# Patient Record
Sex: FEMALE | Race: WHITE | NOT HISPANIC OR LATINO | Employment: OTHER | URBAN - METROPOLITAN AREA
[De-identification: names, ages, dates, MRNs, and addresses within clinical notes are randomized per-mention and may not be internally consistent; named-entity substitution may affect disease eponyms.]

---

## 2017-08-08 PROBLEM — M51.369 DDD (DEGENERATIVE DISC DISEASE), LUMBAR: Status: ACTIVE | Noted: 2017-08-08

## 2024-07-29 ENCOUNTER — HOSPITAL ENCOUNTER (OUTPATIENT)
Dept: RADIOLOGY | Facility: HOSPITAL | Age: 79
Discharge: HOME | End: 2024-07-29
Attending: STUDENT IN AN ORGANIZED HEALTH CARE EDUCATION/TRAINING PROGRAM
Payer: MEDICARE

## 2024-07-29 DIAGNOSIS — Z78.0 ENCOUNTER FOR OSTEOPOROSIS SCREENING IN ASYMPTOMATIC POSTMENOPAUSAL PATIENT: ICD-10-CM

## 2024-07-29 DIAGNOSIS — Z13.820 ENCOUNTER FOR OSTEOPOROSIS SCREENING IN ASYMPTOMATIC POSTMENOPAUSAL PATIENT: ICD-10-CM

## 2024-07-29 PROCEDURE — 77080 DXA BONE DENSITY AXIAL: CPT | Mod: 26,ICN,, | Performed by: RADIOLOGY

## 2024-07-29 PROCEDURE — 77080 DXA BONE DENSITY AXIAL: CPT | Mod: TC,PO

## 2024-07-30 ENCOUNTER — TELEPHONE (OUTPATIENT)
Dept: FAMILY MEDICINE | Facility: CLINIC | Age: 79
End: 2024-07-30
Payer: MEDICARE

## 2024-07-30 NOTE — TELEPHONE ENCOUNTER
----- Message from Scott Nava MD sent at 7/30/2024  8:11 AM CDT -----  Lab reviewed: all OK. Please notify pt. Continue pres meds.

## 2024-08-06 ENCOUNTER — PATIENT MESSAGE (OUTPATIENT)
Dept: FAMILY MEDICINE | Facility: CLINIC | Age: 79
End: 2024-08-06
Payer: MEDICARE

## 2024-08-06 DIAGNOSIS — N32.81 OVERACTIVE BLADDER: Primary | ICD-10-CM

## 2024-08-07 RX ORDER — TOLTERODINE 4 MG/1
4 CAPSULE, EXTENDED RELEASE ORAL DAILY
Qty: 90 CAPSULE | Refills: 0 | Status: SHIPPED | OUTPATIENT
Start: 2024-08-07

## 2024-08-07 RX ORDER — TOLTERODINE 4 MG/1
CAPSULE, EXTENDED RELEASE ORAL
OUTPATIENT
Start: 2024-08-07

## 2024-09-03 ENCOUNTER — OFFICE VISIT (OUTPATIENT)
Dept: FAMILY MEDICINE | Facility: CLINIC | Age: 79
End: 2024-09-03
Payer: MEDICARE

## 2024-09-03 VITALS
WEIGHT: 193.81 LBS | BODY MASS INDEX: 31.15 KG/M2 | HEART RATE: 83 BPM | SYSTOLIC BLOOD PRESSURE: 118 MMHG | DIASTOLIC BLOOD PRESSURE: 65 MMHG | HEIGHT: 66 IN

## 2024-09-03 DIAGNOSIS — K21.9 GASTROESOPHAGEAL REFLUX DISEASE WITHOUT ESOPHAGITIS: Primary | ICD-10-CM

## 2024-09-03 DIAGNOSIS — E78.5 HYPERLIPIDEMIA WITH TARGET LOW DENSITY LIPOPROTEIN (LDL) CHOLESTEROL LESS THAN 100 MG/DL: ICD-10-CM

## 2024-09-03 DIAGNOSIS — M85.89 OSTEOPENIA OF MULTIPLE SITES: ICD-10-CM

## 2024-09-03 PROCEDURE — 1126F AMNT PAIN NOTED NONE PRSNT: CPT | Mod: CPTII,S$GLB,, | Performed by: FAMILY MEDICINE

## 2024-09-03 PROCEDURE — 3074F SYST BP LT 130 MM HG: CPT | Mod: CPTII,S$GLB,, | Performed by: FAMILY MEDICINE

## 2024-09-03 PROCEDURE — 99213 OFFICE O/P EST LOW 20 MIN: CPT | Mod: S$GLB,,, | Performed by: FAMILY MEDICINE

## 2024-09-03 PROCEDURE — 3078F DIAST BP <80 MM HG: CPT | Mod: CPTII,S$GLB,, | Performed by: FAMILY MEDICINE

## 2024-09-03 PROCEDURE — 3288F FALL RISK ASSESSMENT DOCD: CPT | Mod: CPTII,S$GLB,, | Performed by: FAMILY MEDICINE

## 2024-09-03 PROCEDURE — 99999 PR PBB SHADOW E&M-EST. PATIENT-LVL III: CPT | Mod: PBBFAC,,, | Performed by: FAMILY MEDICINE

## 2024-09-03 PROCEDURE — 1101F PT FALLS ASSESS-DOCD LE1/YR: CPT | Mod: CPTII,S$GLB,, | Performed by: FAMILY MEDICINE

## 2024-09-03 PROCEDURE — 1159F MED LIST DOCD IN RCRD: CPT | Mod: CPTII,S$GLB,, | Performed by: FAMILY MEDICINE

## 2024-09-03 NOTE — PATIENT INSTRUCTIONS
We understand that controlling diabetes can feel overwhelming at times. We are here to offer the tools and support to help you manage your diabetes and meet your health goals. Please reference the meal planning guide below.     Diabetic Meal Planning    About this topic  Healthy eating is an important part of keeping your diabetes in control. A balanced diet along with your diabetic drugs will help you control your blood sugar. The right portions of healthy foods may help keep your sugar within your goal range. It may also help to lower or maintain your weight, manage cholesterol, the amount of fat in your blood, and blood pressure.      Image(s)    What will the results be?  Healthy eating may help you lower your blood sugar and keep it in a safe range. Keeping your blood sugar in a safe range may lower your chances for long term problems from your diabetes. You may be less likely to have damage to your kidneys, heart, eyes, or nerves.    What changes to diet are needed?  Healthy eating means:  Eating a range of foods from all food groups.  Eating the right size portions. Read the nutrition facts on each food you eat.  Eating meals and snacks at the same time each day. Do not skip a meal or snack. Skipping meals may cause your blood sugar to go too low, especially if you are on drugs for your diabetes. Skipping meals can also cause you to eat too much at the next meal.  Talk to your diabetes educator about making a personal meal plan for you. They can also help you eat the foods you like by modifying them to be healthier.    Who should use this diet?  This diet is helpful to people with high blood sugar or diabetes.    What foods are good to eat?  It is important to make a healthy meal. Eat a variety of different foods in the right portion.  Fill half of your plate with non-starchy vegetables. Non-starchy vegetables include: Broccoli, green beans, carrots, greens (puma, kale, mustard, turnip), onions, tomatoes,  asparagus, beets, cauliflower, celery, and cucumber. Non-starchy vegetables are high in fiber and low in carbohydrates. These will help keep you full for longer without raising your blood sugar.  Fill 1/4 of your plate with carbohydrates. Try to choose whole grain options. Whole-grain products have more fiber which will make you feel full. Carbohydrates include: Bread, rice, pasta, and starchy vegetables. Starchy vegetables include beans, potatoes, acorn squash, butternut squash, corn, and peas.  Fill 1/4 of your plate with protein. Choose low-fat cuts of meat like boneless, skinless chicken breast; pork loin; 90% lean beef; lean and skinless turkey meat; and fresh fish (not fried) such as tuna, salmon, or mackerel.  Add a low-fat or non-fat dairy product like 8 ounces (240 mL) of 1% or skim milk or 6 ounces (180 mL) of low-fat yogurt. Eat the recommended serving. Milk and yogurt have carbohydrates which raise your blood sugar.  Add a small piece of fruit or 1/2 cup (120 grams) of frozen or canned fruit. Choose canned fruits in juice or water. Fruits include apples, bananas, peaches, pears, pineapple, plums, and oranges. Eat the recommended serving. Fruit has carbohydrates which can raise your blood sugar.  Include healthy fats in your meal like olive oil, canola oil, avocado, and nuts.  Other good foods to include in your diet are:  Foods high in fiber. Adding fiber to your meals may help control your blood sugar and cholesterol levels. It may also help with weight loss and prevent belly problems. If you are younger than 50, it is recommended to get 25 to 38 grams per day. If you are older than 50, it is recommended to get 21 to 30 grams per day. Good sources of fiber include:  Nuts and seeds  Beans, peas, and other legumes  Whole-grain products  Fruits  Vegetables  Smart snacks in the right portion. Do not go too long in between meals. Ask your dietitian when you should have a snack in between your meals. Snack on  things like:  Nuts  Vegetables and low-fat dressing  Light popcorn  Low-fat cheese and crackers  1/2 sandwich    What foods should be limited or avoided?  You may need to limit the amount of some foods you eat and how often you eat them. Foods that should be limited include:  High fat or processed foods like:  Meraz  Sausage  Hot dogs  Whole-fat dairy products  Potato chips  Foods high in sodium like:  Canned soups  Soy sauce and some salad dressings  Cured meats  Salted snack foods like pretzels  Olives  Fats and oils like:  Margarine  Salad dressing  Gravy  Lard or shortening  Foods high in sugar like:  Candy  Cookies  Cake  Ice cream  Table sugar  Soda  Juice drinks  Starches that are not whole grain like:  White rice  French fries  White pasta  White bread  Sugary cereals  Baked goods, pastries, croissants  Beer, wine, and mixed drinks (alcohol). Drink alcohol in moderation (1 drink per day or less for adult women and 2 drinks per day or less for adult men). Drinking alcohol can cause fluctuations in your blood sugar and interfere with how your diabetic drugs work. Talk to your doctor about how you can safely include alcohol into your diet.    What can be done to prevent this health problem?  Some people have a higher chance of developing diabetes than others. This is a life-long problem. You can still lead a normal life. Diabetes can be managed through diet, exercise, and drugs. It is important to have support from your family and friends to help you with your goals.    When do I need to call the doctor?  Blood sugar level is above 240 mg/dL for more than a day  Blood sugar level drops to less than 40 and does not respond to 15 grams of simple carbohydrate, like 4 glucose tablets or 1/2 cup (120 mL) of fruit juice  Trouble breathing  Very sleepy and trouble concentrating  Feeling very thirsty  Needing to urinate (pee) more than normal  Throw up more than once or have many loose stools  You are so sick you  cannot eat or drink  Fever over 101°F (38°C)  Questions about your diet plan  You are not feeling better in 2 to 3 days or you are feeling worse    Helpful tips  Plan ahead. Plan your meals and grocery list before going to the store. This will help you to choose foods that are good for you.  Pack a lunch. Take healthy meals and snacks with you to work.  Keep a food journal to help keep you on track. Take note of foods that keep your blood sugar in goal range. Also note foods and meals that raise or lower your blood sugar. There are apps for your phone that can help with this.  Visit a restaurant's website before eating out. You can see the menu options and nutritional facts. This way, you can see which items best fit into your diet plan. Call ahead if you have questions.    Disclaimer.This generalized information is a limited summary of diagnosis, treatment, and/or medication information. It is not meant to be comprehensive and should be used as a tool to help the user understand and/or assess potential diagnostic and treatment options. It does NOT include all information about conditions, treatments, medications, side effects, or risks that may apply to a specific patient. It is not intended to be medical advice or a substitute for the medical advice, diagnosis, or treatment of a health care provider based on the health care provider's examination and assessment of a patient's specific and unique circumstances. Patients must speak with a health care provider for complete information about their health, medical questions, and treatment options, including any risks or benefits regarding use of medications. This information does not endorse any treatments or medications as safe, effective, or approved for treating a specific patient. [employer] and its affiliates disclaim any warranty or liability relating to this information or the use thereof. The use of this information is governed by the Terms of Use,  available at Terms of Use. ©2022 [employer], Inc. and its affiliates and/or licensors. All rights reserved. Avoid anything with sugar in the 1st 3 ingredients including honey and syrup.  Avoid dried fruits like raise nodes and apricots.  Other fruits and vegetables are okay but the sugar content is higher in bananas and grapes.  Avoid large portions on your starchy foods:  Bread rice potatoes and pasta.

## 2024-09-03 NOTE — PROGRESS NOTES
Patient is here for follow-up on cholesterol.  Patient has a history of hyperlipidemia no hypertension but her blood pressure at home has been running in the 130s.  Patient Active Problem List   Diagnosis    Asthma in remission    Insomnia    Hyperlipidemia with target low density lipoprotein (LDL) cholesterol less than 100 mg/dL    DDD (degenerative disc disease), lumbar    Lumbar herniated disc    Lumbar radiculopathy    Lumbar spondylosis    Lumbar spinal stenosis    Cervical radiculopathy    Cervical stenosis of spinal canal    Herniated cervical disc    DDD (degenerative disc disease), cervical    Osteoarthritis of spine with radiculopathy, cervical region    Gastroesophageal reflux disease    Steatosis of liver    Irritable bowel syndrome with constipation    Overactive bladder    Anxiety and depression    Chronic left shoulder pain    Palpitations    Abnormal CT scan of lung- RLL nodule - 2mm, ex smoker    Liver cyst    Arthropathy of knee    Costochondritis    Multiple joint pain    Intermittent right upper quadrant abdominal pain     Lab Results   Component Value Date    WBC 6.86 02/04/2022    HGB 14.1 02/04/2022    HCT 46.3 02/04/2022     02/04/2022    CHOL 172 02/26/2024    TRIG 227 (H) 02/26/2024    HDL 53 02/26/2024    ALT 25 02/26/2024    AST 20 02/26/2024     02/26/2024    K 3.9 02/26/2024     02/26/2024    CREATININE 0.9 02/26/2024    BUN 12 02/26/2024    CO2 30 (H) 02/26/2024    TSH 1.16 01/15/2019    INR 1.0 04/04/2017    HGBA1C 6.1 07/29/2024     Lab Results   Component Value Date    CHOL 172 02/26/2024    CHOL 186 04/24/2023    CHOL 163 02/04/2022     Lab Results   Component Value Date    HDL 53 02/26/2024    HDL 64 04/24/2023    HDL 54 02/04/2022     Lab Results   Component Value Date    LDLCALC 73.6 02/26/2024    LDLCALC 87.2 04/24/2023    LDLCALC 72.6 02/04/2022     Lab Results   Component Value Date    TRIG 227 (H) 02/26/2024    TRIG 174 (H) 04/24/2023    TRIG 182 (H)  2022       Lab Results   Component Value Date    CHOLHDL 30.8 2024    CHOLHDL 34.4 2023    CHOLHDL 33.1 2022     Subjective:       Patient ID: Kamila Lynn is a 79 y.o. female.    Chief Complaint: Follow-up (6 month. Pt states she is having issues falling asleep and feels she may have fluid build up in ankles and arms.)      Follow-up        Allergies and Medications:   Review of patient's allergies indicates:   Allergen Reactions    Inhalants Shortness Of Breath     Current Outpatient Medications   Medication Sig Dispense Refill    albuterol (PROAIR HFA) 90 mcg/actuation inhaler Inhale 2 puffs into the lungs every 4 (four) hours as needed for Wheezing. 18 g 3    atorvastatin (LIPITOR) 20 MG tablet Take 1 tablet (20 mg total) by mouth once daily. 90 tablet 3    BREO ELLIPTA 200-25 mcg/dose DsDv diskus inhaler Inhale 1 puff by mouth once daily 180 each 1    DULoxetine (CYMBALTA) 60 MG capsule Take 1 capsule by mouth once daily 90 capsule 0    fexofenadine (ALLEGRA) 60 MG tablet Take 60 mg by mouth once daily.      tolterodine (DETROL LA) 4 MG 24 hr capsule Take 1 capsule (4 mg total) by mouth once daily. 90 capsule 0    vit A/vit C/vit E/zinc/copper (ICAPS AREDS ORAL) Take by mouth.      vitamin D (VITAMIN D3) 1000 units Tab Take 1 tablet (1,000 Units total) by mouth once daily. 30 tablet 11    calcium carbonate (OS-SHANNAN) 600 mg calcium (1,500 mg) Tab Take 600 mg by mouth once. (Patient not taking: Reported on 9/3/2024)      cetirizine (ZYRTEC) 10 MG tablet Take 1 tablet (10 mg total) by mouth daily as needed for Allergies. 7 tablet 0    fluocinolone acetonide oiL 0.01 % Drop SMARTSI Drop(s) In Ear(s) Twice Daily PRN (Patient not taking: Reported on 9/3/2024)      fluticasone (FLONASE) 50 mcg/actuation nasal spray 1 spray (50 mcg total) by Each Nare route once daily. (Patient not taking: Reported on 2023) 3 Bottle 3     No current facility-administered medications for this visit.        Family History:   Family History   Problem Relation Name Age of Onset    Alzheimer's disease Mother      Pulmonary embolism Father      Cancer Paternal Grandfather          colon       Social History:   Social History     Socioeconomic History    Marital status:    Occupational History    Occupation: retired     Employer: [employer]   Tobacco Use    Smoking status: Former     Current packs/day: 0.00     Average packs/day: 0.5 packs/day for 10.0 years (5.0 ttl pk-yrs)     Types: Cigarettes     Start date: 2003     Quit date: 2013     Years since quittin.3     Passive exposure: Never    Smokeless tobacco: Never    Tobacco comments:        Substance and Sexual Activity    Alcohol use: No    Drug use: No    Sexual activity: Yes     Partners: Male   Social History Narrative    Live with      Social Determinants of Health     Physical Activity: Unknown (2/3/2022)    Exercise Vital Sign     Days of Exercise per Week: 0 days   Stress: No Stress Concern Present (2/3/2022)    [facility] - Occupational Stress Questionnaire     Feeling of Stress : Not at all       Review of Systems    Objective:     Vitals:    24 1009   BP: 118/65   Pulse: 83        Physical Exam  Vitals and nursing note reviewed.   Constitutional:       General: She is not in acute distress.     Appearance: Normal appearance. She is well-developed and normal weight. She is not ill-appearing, toxic-appearing or diaphoretic.   HENT:      Head: Normocephalic and atraumatic.   Eyes:      Pupils: Pupils are equal, round, and reactive to light.   Cardiovascular:      Rate and Rhythm: Normal rate and regular rhythm.      Heart sounds: Normal heart sounds. No murmur heard.     No friction rub. No gallop.   Pulmonary:      Effort: Pulmonary effort is normal. No respiratory distress.      Breath sounds: Normal breath sounds. No stridor. No wheezing, rhonchi or rales.   Chest:      Chest  wall: No tenderness.   Musculoskeletal:      Right lower leg: No edema.      Left lower leg: No edema.   Neurological:      Mental Status: She is alert.   Psychiatric:         Behavior: Behavior normal.         Thought Content: Thought content normal.         Judgment: Judgment normal.         Assessment:       1. Gastroesophageal reflux disease without esophagitis    2. Hyperlipidemia with target low density lipoprotein (LDL) cholesterol less than 100 mg/dL    3. Osteopenia of multiple sites        Plan:     1. Gastroesophageal reflux disease without esophagitis  Overview:  3/2016:  abd pain after using NSAIDS,  PPI  first, consider EGD if not improving.7/2016: not on ppi . abd pain improved.      2. Hyperlipidemia with target low density lipoprotein (LDL) cholesterol less than 100 mg/dL  -     Lipid Panel; Future; Expected date: 09/03/2024  -     Comprehensive Metabolic Panel; Future; Expected date: 09/03/2024    3. Osteopenia of multiple sites        Kamila was seen today for follow-up.    Diagnoses and all orders for this visit:    Gastroesophageal reflux disease without esophagitis    Hyperlipidemia with target low density lipoprotein (LDL) cholesterol less than 100 mg/dL  -     Lipid Panel; Future  -     Comprehensive Metabolic Panel; Future    Osteopenia of multiple sites         Follow up in about 6 months (around 3/3/2025).

## 2024-09-18 DIAGNOSIS — E78.5 HYPERLIPIDEMIA WITH TARGET LOW DENSITY LIPOPROTEIN (LDL) CHOLESTEROL LESS THAN 100 MG/DL: ICD-10-CM

## 2024-09-18 RX ORDER — ATORVASTATIN CALCIUM 20 MG/1
20 TABLET, FILM COATED ORAL
Qty: 90 TABLET | Refills: 1 | Status: SHIPPED | OUTPATIENT
Start: 2024-09-18

## 2024-10-31 DIAGNOSIS — F41.1 GAD (GENERALIZED ANXIETY DISORDER): ICD-10-CM

## 2024-11-01 RX ORDER — DULOXETIN HYDROCHLORIDE 60 MG/1
60 CAPSULE, DELAYED RELEASE ORAL
Qty: 90 CAPSULE | Refills: 1 | Status: SHIPPED | OUTPATIENT
Start: 2024-11-01

## 2024-12-13 DIAGNOSIS — N32.81 OVERACTIVE BLADDER: ICD-10-CM

## 2024-12-16 RX ORDER — TOLTERODINE 4 MG/1
4 CAPSULE, EXTENDED RELEASE ORAL
Qty: 90 CAPSULE | Refills: 0 | Status: SHIPPED | OUTPATIENT
Start: 2024-12-16

## 2025-01-02 DIAGNOSIS — F41.1 GAD (GENERALIZED ANXIETY DISORDER): ICD-10-CM

## 2025-01-03 RX ORDER — DULOXETIN HYDROCHLORIDE 60 MG/1
60 CAPSULE, DELAYED RELEASE ORAL
Qty: 90 CAPSULE | Refills: 1 | OUTPATIENT
Start: 2025-01-03

## 2025-03-06 ENCOUNTER — TELEPHONE (OUTPATIENT)
Dept: FAMILY MEDICINE | Facility: CLINIC | Age: 80
End: 2025-03-06
Payer: MEDICARE

## 2025-03-06 DIAGNOSIS — M85.9 DISORDER OF BONE DENSITY AND STRUCTURE, UNSPECIFIED: ICD-10-CM

## 2025-03-06 DIAGNOSIS — N32.81 OVERACTIVE BLADDER: ICD-10-CM

## 2025-03-06 DIAGNOSIS — M85.89 OSTEOPENIA OF MULTIPLE SITES: Primary | ICD-10-CM

## 2025-03-17 DIAGNOSIS — N32.81 OVERACTIVE BLADDER: ICD-10-CM

## 2025-03-17 DIAGNOSIS — E78.5 HYPERLIPIDEMIA WITH TARGET LOW DENSITY LIPOPROTEIN (LDL) CHOLESTEROL LESS THAN 100 MG/DL: ICD-10-CM

## 2025-03-17 RX ORDER — TOLTERODINE 4 MG/1
4 CAPSULE, EXTENDED RELEASE ORAL
Qty: 90 CAPSULE | Refills: 1 | Status: SHIPPED | OUTPATIENT
Start: 2025-03-17

## 2025-03-18 ENCOUNTER — LAB VISIT (OUTPATIENT)
Dept: LAB | Facility: HOSPITAL | Age: 80
End: 2025-03-18
Attending: FAMILY MEDICINE
Payer: MEDICARE

## 2025-03-18 DIAGNOSIS — N32.81 OVERACTIVE BLADDER: ICD-10-CM

## 2025-03-18 DIAGNOSIS — E78.5 HYPERLIPIDEMIA WITH TARGET LOW DENSITY LIPOPROTEIN (LDL) CHOLESTEROL LESS THAN 100 MG/DL: ICD-10-CM

## 2025-03-18 LAB
ALBUMIN SERPL BCP-MCNC: 4 G/DL (ref 3.5–5.2)
ALBUMIN/CREAT UR: 12.1 UG/MG (ref 0–30)
ALP SERPL-CCNC: 115 U/L (ref 55–135)
ALT SERPL W/O P-5'-P-CCNC: 32 U/L (ref 10–44)
ANION GAP SERPL CALC-SCNC: 8 MMOL/L (ref 8–16)
AST SERPL-CCNC: 27 U/L (ref 10–40)
BILIRUB SERPL-MCNC: 0.6 MG/DL (ref 0.1–1)
BUN SERPL-MCNC: 15 MG/DL (ref 8–23)
CALCIUM SERPL-MCNC: 9.5 MG/DL (ref 8.7–10.5)
CHLORIDE SERPL-SCNC: 105 MMOL/L (ref 95–110)
CHOLEST SERPL-MCNC: 164 MG/DL (ref 120–199)
CHOLEST/HDLC SERPL: 2.8 {RATIO} (ref 2–5)
CO2 SERPL-SCNC: 26 MMOL/L (ref 23–29)
CREAT SERPL-MCNC: 0.7 MG/DL (ref 0.5–1.4)
CREAT UR-MCNC: 80.2 MG/DL (ref 15–325)
EST. GFR  (NO RACE VARIABLE): >60 ML/MIN/1.73 M^2
GLUCOSE SERPL-MCNC: 87 MG/DL (ref 70–110)
HDLC SERPL-MCNC: 59 MG/DL (ref 40–75)
HDLC SERPL: 36 % (ref 20–50)
LDLC SERPL CALC-MCNC: 72.2 MG/DL (ref 63–159)
MICROALBUMIN UR DL<=1MG/L-MCNC: 9.7 UG/ML
NONHDLC SERPL-MCNC: 105 MG/DL
POTASSIUM SERPL-SCNC: 4.3 MMOL/L (ref 3.5–5.1)
PROT SERPL-MCNC: 6.9 G/DL (ref 6–8.4)
SODIUM SERPL-SCNC: 139 MMOL/L (ref 136–145)
TRIGL SERPL-MCNC: 164 MG/DL (ref 30–150)

## 2025-03-18 PROCEDURE — 80061 LIPID PANEL: CPT | Performed by: FAMILY MEDICINE

## 2025-03-18 PROCEDURE — 36415 COLL VENOUS BLD VENIPUNCTURE: CPT | Performed by: FAMILY MEDICINE

## 2025-03-18 PROCEDURE — 80053 COMPREHEN METABOLIC PANEL: CPT | Performed by: FAMILY MEDICINE

## 2025-03-18 PROCEDURE — 82043 UR ALBUMIN QUANTITATIVE: CPT | Performed by: FAMILY MEDICINE

## 2025-03-18 RX ORDER — ATORVASTATIN CALCIUM 20 MG/1
20 TABLET, FILM COATED ORAL
Qty: 90 TABLET | Refills: 0 | Status: SHIPPED | OUTPATIENT
Start: 2025-03-18

## 2025-03-18 NOTE — TELEPHONE ENCOUNTER
Care Due:                  Date            Visit Type   Department     Provider  --------------------------------------------------------------------------------                                             [facility]                              [facility]   [address]  Last Visit: 09-      PATIENT      FAMILY Jason Nava                               -         [facility] [address]  Next Visit: 04-      CARE ([facility])   [facility] Jason Nava                                                            Last  Test          Frequency    Reason                     Performed    Due Date  --------------------------------------------------------------------------------    CMP.........  12 months..  DULoxetine, atorvastatin.  02- 02-    Lipid Panel.  12 months..  atorvastatin.............  02- 02-    [facility] Embedded Care Due Messages. Reference number: [id number].   3/18/2025 6:02:55 AM CDT

## 2025-03-18 NOTE — TELEPHONE ENCOUNTER
Refill Routing Note   Medication(s) are not appropriate for processing by [facility] for the following reason(s):        Required labs outdated    [facility] action(s):  Defer     Requires labs : Yes             Appointments  past 12m or future 3m with PCP    Date Provider   Last Visit   9/3/2024 Scott Nava MD   Next Visit   4/25/2025 Scott Nava MD   ED visits in past 90 days: 0        Note composed:6:11 AM 03/18/2025

## 2025-03-19 ENCOUNTER — TELEPHONE (OUTPATIENT)
Dept: FAMILY MEDICINE | Facility: CLINIC | Age: 80
End: 2025-03-19
Payer: MEDICARE

## 2025-03-19 ENCOUNTER — RESULTS FOLLOW-UP (OUTPATIENT)
Dept: FAMILY MEDICINE | Facility: CLINIC | Age: 80
End: 2025-03-19

## 2025-03-19 NOTE — TELEPHONE ENCOUNTER
----- Message from Scott Nava MD sent at 3/19/2025  8:07 AM CDT -----  Lab reviewed: all OK. Please notify pt. Continue pres meds.  ----- Message -----  From: [facility] S[facility] Lab Interface  Sent: 3/18/2025   1:55 PM CDT  To: Scott Nava MD

## 2025-04-10 DIAGNOSIS — F41.1 GAD (GENERALIZED ANXIETY DISORDER): ICD-10-CM

## 2025-04-11 RX ORDER — DULOXETIN HYDROCHLORIDE 60 MG/1
60 CAPSULE, DELAYED RELEASE ORAL
Qty: 90 CAPSULE | Refills: 1 | Status: SHIPPED | OUTPATIENT
Start: 2025-04-11

## 2025-04-11 NOTE — TELEPHONE ENCOUNTER
Refill Routing Note   Medication(s) are not appropriate for processing by [facility] for the following reason(s):     [facility] not previously overridden by current provider--after initial override, the [facility] will be able to continue overrides      Drug-disease interaction: DULoxetine and Steatosis of liver; Liver cyst     [facility] action(s):  Defer           Pharmacist review requested: Yes     Appointments  past 12m or future 3m with PCP    Date Provider   Last Visit   9/3/2024 Scott Nava MD   Next Visit   4/25/2025 Scott Nava MD   ED visits in past 90 days: 0        Note composed:11:51 PM 04/10/2025

## 2025-04-11 NOTE — TELEPHONE ENCOUNTER
Refill Decision Note   Kamila Lynn  is requesting a refill authorization.  Brief Assessment and Rationale for Refill:  Approve     Medication Therapy Plan:         Pharmacist review requested: Yes   Comments:     Note composed:6:26 AM 04/11/2025

## 2025-04-11 NOTE — TELEPHONE ENCOUNTER
No care due was identified.  [facility] Embedded Care Due Messages. Reference number: [id number].   4/10/2025 11:49:33 PM CDT

## 2025-04-25 ENCOUNTER — OFFICE VISIT (OUTPATIENT)
Dept: FAMILY MEDICINE | Facility: CLINIC | Age: 80
End: 2025-04-25
Payer: MEDICARE

## 2025-04-25 VITALS
TEMPERATURE: 98 F | WEIGHT: 198.44 LBS | OXYGEN SATURATION: 97 % | HEART RATE: 74 BPM | RESPIRATION RATE: 18 BRPM | HEIGHT: 66 IN | DIASTOLIC BLOOD PRESSURE: 82 MMHG | BODY MASS INDEX: 31.89 KG/M2 | SYSTOLIC BLOOD PRESSURE: 114 MMHG

## 2025-04-25 DIAGNOSIS — K76.0 STEATOSIS OF LIVER: ICD-10-CM

## 2025-04-25 DIAGNOSIS — R73.01 IMPAIRED FASTING BLOOD SUGAR: ICD-10-CM

## 2025-04-25 DIAGNOSIS — J44.9 CHRONIC OBSTRUCTIVE PULMONARY DISEASE, UNSPECIFIED COPD TYPE: ICD-10-CM

## 2025-04-25 DIAGNOSIS — J45.998 ASTHMA IN REMISSION: ICD-10-CM

## 2025-04-25 DIAGNOSIS — M47.22 OSTEOARTHRITIS OF SPINE WITH RADICULOPATHY, CERVICAL REGION: ICD-10-CM

## 2025-04-25 DIAGNOSIS — E78.5 HYPERLIPIDEMIA WITH TARGET LOW DENSITY LIPOPROTEIN (LDL) CHOLESTEROL LESS THAN 100 MG/DL: Primary | ICD-10-CM

## 2025-04-25 PROCEDURE — 99999 PR PBB SHADOW E&M-EST. PATIENT-LVL IV: CPT | Mod: PBBFAC,,, | Performed by: FAMILY MEDICINE

## 2025-04-25 NOTE — PROGRESS NOTES
Subjective:       Patient ID: Kamila Lynn is a 79 y.o. female.    Chief Complaint: Follow-up (6 month f/u)      History of Present Illness    CHIEF COMPLAINT:  Ms. Lynn presents for a six-month follow-up on chronic conditions, including cholesterol management, back and neck pain, and pre-diabetes.    HPI:  Ms. Lynn reports ongoing back and neck pain. She underwent an epidural injection and ablation procedure with pain management, which provided some relief but did not fully resolve the pain. She has significant pain when walking.    Her glucose levels have been monitored recently. A post-meal glucose reading was 153 mg/dL, which was slightly elevated. Her most recent A1C was 6.1%, indicating pre-diabetes.    Ms. Lynn has a history of high focal asthma, diagnosed in her late 30s. She continues to use daily maintenance inhalers for breathing management, which appears to be stable.    Regarding weight management, patient reports difficulty losing weight. She had significant weight loss last summer, going down to 193 lbs in September, but has since regained most of it, currently weighing 198 lbs. She is frustrated with her inability to maintain weight loss and identifies this as her main concern.    Ms. Lynn also has a history of fatty liver disease and arthritis, which affects her mobility.    Ms. Lynn denies having diabetes.    MEDICATIONS:  Ms. Lynn is on Metformin and Rebelsus for diabetes management. She uses Flonase for nasal symptoms and takes a daily multivitamin. For allergies, she is on Allegra, having discontinued Zyrtec. She uses maintenance inhalers daily for breathing and asthma control. Ms. Lynn restarted Rebelsus on March 28th after stopping it for a while.    MEDICAL HISTORY:  Ms. Lynn has a history of pre-diabetes with an A1C of 6.1. She was diagnosed with high focal asthma in her late 30s. She also has fatty liver, impaired fasting glucose,  "arthritis, hyperlipidemia, and spinal stenosis.    SURGICAL HISTORY:  Ms. Lynn has received epidural injections and undergone ablation procedures for back and neck pain management.    TEST RESULTS:  Ms. Lynn's A1C was 6.1 about a month ago. Her glucose level was 153 after supper a few days ago. Her cholesterol levels, checked about a month ago, were described as "good".      ROS:  Musculoskeletal: +joint pain, +back pain, +pain with movement, +limited movement  Allergic: +allergic reactions, +seasonal allergies          Allergies and Medications:   Review of patient's allergies indicates:   Allergen Reactions    Inhalants Shortness Of Breath     Current Medications[1]    Family History:   Family History   Problem Relation Name Age of Onset    Alzheimer's disease Mother      Pulmonary embolism Father      Cancer Paternal Grandfather          colon       Social History:   Social History[2]        Objective:     Vitals:    04/25/25 1356   BP: 114/82   Pulse: 74   Resp: 18   Temp: 98.3 °F (36.8 °C)        Physical Exam    General: No acute distress. Well-developed. Well-nourished.  Eyes: EOMI. Sclerae anicteric.  HENT: Normocephalic. Atraumatic. Nares patent. Moist oral mucosa.  Cardiovascular: Regular rate. Regular rhythm. No murmurs. No rubs. No gallops. Normal S1, S2. PMI at left mid-clavicular line.  Respiratory: Normal respiratory effort. Clear to auscultation bilaterally. No rales. No rhonchi. No wheezing.  Musculoskeletal: No  obvious deformity.  Extremities: No lower extremity edema. Extremities symmetrical and developed with some arthritis. Trace peripheral edema bilaterally.  Neurological: Alert & oriented x3. No slurred speech. Normal gait.  Psychiatric: Normal mood. Normal affect. Good insight. Good judgment.  Skin: Warm. Acrocyanosis with good capillary refill. No rash.            Assessment:       1. Hyperlipidemia with target low density lipoprotein (LDL) cholesterol less than 100 mg/dL    2. " Osteoarthritis of spine with radiculopathy, cervical region    3. Steatosis of liver    4. Asthma in remission    5. Chronic obstructive pulmonary disease, unspecified COPD type    6. Impaired fasting blood sugar        Plan:     1. Hyperlipidemia with target low density lipoprotein (LDL) cholesterol less than 100 mg/dL    2. Osteoarthritis of spine with radiculopathy, cervical region    3. Steatosis of liver    4. Asthma in remission  Overview:  3/2016: asthma vs COPD ? on advair since 2013. stable.      5. Chronic obstructive pulmonary disease, unspecified COPD type  Assessment & Plan:  Daily maintenance inhaler and stable      6. Impaired fasting blood sugar        Assessment & Plan    J44.9 Chronic obstructive pulmonary disease, unspecified  R73.03 Prediabetes  R73.01 Impaired fasting glucose  E78.5 Hyperlipidemia, unspecified  K76.0 Fatty (change of) liver, not elsewhere classified  J45.30 Mild persistent asthma, uncomplicated  M19.91 Primary osteoarthritis, unspecified site  M54.50 Low back pain, unspecified  M54.2 Cervicalgia  R23.0 Cyanosis  R60.0 Localized edema    IMPRESSION:  - Pre-diabetes with A1C of 6.1, not meeting criteria for diabetes diagnosis.  - Considered weight loss strategies, including diabetic diet plan to help with portion control and reduce sugar/carbohydrate intake.  - Monitored cholesterol levels, which were reported as good in recent labs.  - Assessed effectiveness of current asthma management with daily maintenance inhalers.  - Evaluated back pain management, noting recent epidural and ablation procedures by pain management specialist.  - Considered thyroid function reassessment due to past history of hypothyroidism.    CHRONIC OBSTRUCTIVE PULMONARY DISEASE, UNSPECIFIED:  - Continued patient's daily maintenance inhalers for breathing management.  - Lung auscultation revealed clear bilateral breath sounds.  - Noted patient's history of high focal asthma diagnosed in late  30s.    PREDIABETES/IMPAIRED FASTING GLUCOSE:  - Evaluated A1C level of 6.1, indicating prediabetes.  - Explained that A1C level above 6.5 are considered diabetic, with the goal to maintain below 6.5.  - Ordered A1C test in 1 month to assess Rebelsus efficacy.  - Noted glucose level of 153 after supper, which is slightly elevated.  - Ms. Lynn has not been checking fasting glucose levels at home.  - Recommend and provided instructions for a diabetic diet to aid in weight loss and blood sugar control, emphasizing benefits even for non-diabetics by reducing sugars and complex carbohydrates.  - Discussed mechanism of action for weight loss medications like Ozempic, which reduce appetite and promote early satiety.    HYPERLIPIDEMIA:  - Reviewed cholesterol levels from tests conducted approximately 1 month ago, which were within acceptable range.    FATTY LIVER DISEASE:  - Noted the patient's diagnosis of fatty liver.    ASTHMA:  - No changes to daily maintenance inhalers.    OSTEOARTHRITIS:  - Noted presence of arthritis in extremities, acknowledging it as a contributing factor to the patient's reduced mobility.    LOW BACK PAIN:  - Ms. Lynn reported ongoing back pain, especially when walking.  - Reviewed recent epidural and ablation treatment received from a pain management specialist.    CERVICALGIA:  - Noted patient's report of ongoing neck pain.    CYANOSIS:  - Observed acrocyanosis in extremities during physical exam.    LOCALIZED EDEMA:  - Observed trace peripheral edema bilaterally in extremities during physical exam.    OTHER:  - Started Vitamin D supplement, once weekly dosing.    FOLLOW-UP:  - Scheduled follow-up visit in 4-6 months or upon return from travel.        Kamila was seen today for follow-up.    Diagnoses and all orders for this visit:    Hyperlipidemia with target low density lipoprotein (LDL) cholesterol less than 100 mg/dL    Osteoarthritis of spine with radiculopathy, cervical  region    Steatosis of liver    Asthma in remission    Chronic obstructive pulmonary disease, unspecified COPD type    Impaired fasting blood sugar         Follow up in about 6 months (around 10/25/2025).  This note was generated with the assistance of ambient listening technology. Verbal consent was obtained by the patient and accompanying visitor(s) for the recording of patient appointment to facilitate this note. I attest to having reviewed and edited the generated note for accuracy, though some syntax or spelling errors may persist. Please contact the author of this note for any clarification.     Subjective:       Patient ID: Kamila Lynn is a 79 y.o. female.    Chief Complaint: Follow-up (6 month f/u)      History of Present Illness    CHIEF COMPLAINT:  Ms. Lynn presents for a six-month follow-up on chronic conditions, including cholesterol management, back and neck pain, and pre-diabetes.    HPI:  Ms. Lynn reports ongoing back and neck pain. She underwent an epidural injection and ablation procedure with pain management, which provided some relief but did not fully resolve the pain. She has significant pain when walking.    Her glucose levels have been monitored recently. A post-meal glucose reading was 153 mg/dL, which was slightly elevated. Her most recent A1C was 6.1%, indicating pre-diabetes.    Ms. Lynn has a history of high focal asthma, diagnosed in her late 30s. She continues to use daily maintenance inhalers for breathing management, which appears to be stable.    Regarding weight management, patient reports difficulty losing weight. She had significant weight loss last summer, going down to 193 lbs in September, but has since regained most of it, currently weighing 198 lbs. She is frustrated with her inability to maintain weight loss and identifies this as her main concern.    Ms. Lynn also has a history of fatty liver disease and arthritis, which affects her  "mobility.    Ms. Lynn denies having diabetes.    MEDICATIONS:  Ms. Lynn is on Metformin and Rebelsus for diabetes management. She uses Flonase for nasal symptoms and takes a daily multivitamin. For allergies, she is on Allegra, having discontinued Zyrtec. She uses maintenance inhalers daily for breathing and asthma control. Ms. Lynn restarted Rebelsus on March 28th after stopping it for a while.    MEDICAL HISTORY:  Ms. Lynn has a history of pre-diabetes with an A1C of 6.1. She was diagnosed with high focal asthma in her late 30s. She also has fatty liver, impaired fasting glucose, arthritis, hyperlipidemia, and spinal stenosis.    SURGICAL HISTORY:  Ms. Lynn has received epidural injections and undergone ablation procedures for back and neck pain management.    TEST RESULTS:  Ms. Lynn's A1C was 6.1 about a month ago. Her glucose level was 153 after supper a few days ago. Her cholesterol levels, checked about a month ago, were described as "good".      ROS:  Musculoskeletal: +joint pain, +back pain, +pain with movement, +limited movement  Allergic: +allergic reactions, +seasonal allergies          Allergies and Medications:   Review of patient's allergies indicates:   Allergen Reactions    Inhalants Shortness Of Breath     Current Medications[3]    Family History:   Family History   Problem Relation Name Age of Onset    Alzheimer's disease Mother      Pulmonary embolism Father      Cancer Paternal Grandfather          colon       Social History:   Social History[4]        Objective:     Vitals:    04/25/25 1356   BP: 114/82   Pulse: 74   Resp: 18   Temp: 98.3 °F (36.8 °C)     Lab Results   Component Value Date    WBC 6.86 02/04/2022    HGB 14.1 02/04/2022    HCT 46.3 02/04/2022     02/04/2022    CHOL 164 03/18/2025    TRIG 164 (H) 03/18/2025    HDL 59 03/18/2025    ALT 32 03/18/2025    AST 27 03/18/2025     03/18/2025    K 4.3 03/18/2025     03/18/2025    " CREATININE 0.7 03/18/2025    BUN 15 03/18/2025    CO2 26 03/18/2025    TSH 1.16 01/15/2019    INR 1.0 04/04/2017    HGBA1C 6.1 07/29/2024     Wt Readings from Last 5 Encounters:   04/25/25 90 kg (198 lb 6.6 oz)   09/03/24 87.9 kg (193 lb 12.8 oz)   07/15/24 90.7 kg (200 lb)   03/01/24 90.7 kg (200 lb)   08/17/23 85.7 kg (189 lb)            Physical Exam    General: No acute distress. Well-developed. Well-nourished.  Eyes: EOMI. Sclerae anicteric.  HENT: Normocephalic. Atraumatic. Nares patent. Moist oral mucosa.  Cardiovascular: Regular rate. Regular rhythm. No murmurs. No rubs. No gallops. Normal S1, S2. PMI at left mid-clavicular line.  Respiratory: Normal respiratory effort. Clear to auscultation bilaterally. No rales. No rhonchi. No wheezing.  Musculoskeletal: No  obvious deformity.  Extremities: No lower extremity edema. Extremities symmetrical and developed with some arthritis. Trace peripheral edema bilaterally.  Neurological: Alert & oriented x3. No slurred speech. Normal gait.  Psychiatric: Normal mood. Normal affect. Good insight. Good judgment.  Skin: Warm. Acrocyanosis with good capillary refill. No rash.            Assessment:       1. Hyperlipidemia with target low density lipoprotein (LDL) cholesterol less than 100 mg/dL    2. Osteoarthritis of spine with radiculopathy, cervical region    3. Steatosis of liver    4. Asthma in remission    5. Chronic obstructive pulmonary disease, unspecified COPD type    6. Impaired fasting blood sugar        Plan:     1. Hyperlipidemia with target low density lipoprotein (LDL) cholesterol less than 100 mg/dL    2. Osteoarthritis of spine with radiculopathy, cervical region    3. Steatosis of liver    4. Asthma in remission  Overview:  3/2016: asthma vs COPD ? on advair since 2013. stable.      5. Chronic obstructive pulmonary disease, unspecified COPD type  Assessment & Plan:  Daily maintenance inhaler and stable      6. Impaired fasting blood sugar        Assessment  & Plan    J44.9 Chronic obstructive pulmonary disease, unspecified  R73.03 Prediabetes  R73.01 Impaired fasting glucose  E78.5 Hyperlipidemia, unspecified  K76.0 Fatty (change of) liver, not elsewhere classified  J45.30 Mild persistent asthma, uncomplicated  M19.91 Primary osteoarthritis, unspecified site  M54.50 Low back pain, unspecified  M54.2 Cervicalgia  R23.0 Cyanosis  R60.0 Localized edema    IMPRESSION:  - Pre-diabetes with A1C of 6.1, not meeting criteria for diabetes diagnosis.  - Considered weight loss strategies, including diabetic diet plan to help with portion control and reduce sugar/carbohydrate intake.  - Monitored cholesterol levels, which were reported as good in recent labs.  - Assessed effectiveness of current asthma management with daily maintenance inhalers.  - Evaluated back pain management, noting recent epidural and ablation procedures by pain management specialist.  - Considered thyroid function reassessment due to past history of hypothyroidism.    CHRONIC OBSTRUCTIVE PULMONARY DISEASE, UNSPECIFIED:  - Continued patient's daily maintenance inhalers for breathing management.  - Lung auscultation revealed clear bilateral breath sounds.  - Noted patient's history of high focal asthma diagnosed in late 30s.    PREDIABETES/IMPAIRED FASTING GLUCOSE:  - Evaluated A1C level of 6.1, indicating prediabetes.  - Explained that A1C level above 6.5 are considered diabetic, with the goal to maintain below 6.5.  - Ordered A1C test in 1 month to assess Rebelsus efficacy.  - Noted glucose level of 153 after supper, which is slightly elevated.  - Ms. Lynn has not been checking fasting glucose levels at home.  - Recommend and provided instructions for a diabetic diet to aid in weight loss and blood sugar control, emphasizing benefits even for non-diabetics by reducing sugars and complex carbohydrates.  - Discussed mechanism of action for weight loss medications like Ozempic, which reduce appetite and  promote early satiety.    HYPERLIPIDEMIA:  - Reviewed cholesterol levels from tests conducted approximately 1 month ago, which were within acceptable range.    FATTY LIVER DISEASE:  - Noted the patient's diagnosis of fatty liver.    ASTHMA:  - No changes to daily maintenance inhalers.    OSTEOARTHRITIS:  - Noted presence of arthritis in extremities, acknowledging it as a contributing factor to the patient's reduced mobility.    LOW BACK PAIN:  - Ms. Lynn reported ongoing back pain, especially when walking.  - Reviewed recent epidural and ablation treatment received from a pain management specialist.    CERVICALGIA:  - Noted patient's report of ongoing neck pain.    CYANOSIS:  - Observed acrocyanosis in extremities during physical exam.    LOCALIZED EDEMA:  - Observed trace peripheral edema bilaterally in extremities during physical exam.    OTHER:  - Started Vitamin D supplement, once weekly dosing.    FOLLOW-UP:  - Scheduled follow-up visit in 4-6 months or upon return from travel.        Kamila was seen today for follow-up.    Diagnoses and all orders for this visit:    Hyperlipidemia with target low density lipoprotein (LDL) cholesterol less than 100 mg/dL    Osteoarthritis of spine with radiculopathy, cervical region    Steatosis of liver    Asthma in remission    Chronic obstructive pulmonary disease, unspecified COPD type    Impaired fasting blood sugar         Follow up in about 6 months (around 10/25/2025).  This note was generated with the assistance of ambient listening technology. Verbal consent was obtained by the patient and accompanying visitor(s) for the recording of patient appointment to facilitate this note. I attest to having reviewed and edited the generated note for accuracy, though some syntax or spelling errors may persist. Please contact the author of this note for any clarification.            [1]   Current Outpatient Medications   Medication Sig Dispense Refill    albuterol (PROAIR  HFA) 90 mcg/actuation inhaler Inhale 2 puffs into the lungs every 4 (four) hours as needed for Wheezing. 18 g 3    atorvastatin (LIPITOR) 20 MG tablet Take 1 tablet by mouth once daily 90 tablet 0    BREO ELLIPTA 200-25 mcg/dose DsDv diskus inhaler Inhale 1 puff by mouth once daily 180 each 0    DULoxetine (CYMBALTA) 60 MG capsule Take 1 capsule by mouth once daily 90 capsule 1    fexofenadine (ALLEGRA) 60 MG tablet Take 60 mg by mouth once daily.      fluticasone (FLONASE) 50 mcg/actuation nasal spray 1 spray (50 mcg total) by Each Nare route once daily. 3 Bottle 3    tolterodine (DETROL LA) 4 MG 24 hr capsule Take 1 capsule by mouth once daily 90 capsule 1    vitamin D (VITAMIN D3) 1000 units Tab Take 1 tablet (1,000 Units total) by mouth once daily. 30 tablet 11    calcium carbonate (OS-SHANNAN) 600 mg calcium (1,500 mg) Tab Take 600 mg by mouth once. (Patient not taking: Reported on 2025)       No current facility-administered medications for this visit.   [2]   Social History  Socioeconomic History    Marital status:    Occupational History    Occupation: retired     Employer: [employer]   Tobacco Use    Smoking status: Former     Current packs/day: 0.00     Average packs/day: 0.5 packs/day for 10.0 years (5.0 ttl pk-yrs)     Types: Cigarettes     Start date: 2003     Quit date: 2013     Years since quittin.0     Passive exposure: Never    Smokeless tobacco: Never    Tobacco comments:        Substance and Sexual Activity    Alcohol use: No    Drug use: No    Sexual activity: Yes     Partners: Male   Social History Narrative    Live with      Social Drivers of Health     Physical Activity: Unknown (2/3/2022)    Exercise Vital Sign     Days of Exercise per Week: 0 days   Stress: No Stress Concern Present (2/3/2022)    [employer] - Occupational Stress Questionnaire     Feeling of Stress : Not at all   [3]   Current Outpatient Medications    Medication Sig Dispense Refill    albuterol (PROAIR HFA) 90 mcg/actuation inhaler Inhale 2 puffs into the lungs every 4 (four) hours as needed for Wheezing. 18 g 3    atorvastatin (LIPITOR) 20 MG tablet Take 1 tablet by mouth once daily 90 tablet 0    BREO ELLIPTA 200-25 mcg/dose DsDv diskus inhaler Inhale 1 puff by mouth once daily 180 each 0    DULoxetine (CYMBALTA) 60 MG capsule Take 1 capsule by mouth once daily 90 capsule 1    fexofenadine (ALLEGRA) 60 MG tablet Take 60 mg by mouth once daily.      fluticasone (FLONASE) 50 mcg/actuation nasal spray 1 spray (50 mcg total) by Each Nare route once daily. 3 Bottle 3    tolterodine (DETROL LA) 4 MG 24 hr capsule Take 1 capsule by mouth once daily 90 capsule 1    vitamin D (VITAMIN D3) 1000 units Tab Take 1 tablet (1,000 Units total) by mouth once daily. 30 tablet 11    calcium carbonate (OS-SHANNAN) 600 mg calcium (1,500 mg) Tab Take 600 mg by mouth once. (Patient not taking: Reported on 2025)       No current facility-administered medications for this visit.   [4]   Social History  Socioeconomic History    Marital status:    Occupational History    Occupation: retired     Employer: [employer]   Tobacco Use    Smoking status: Former     Current packs/day: 0.00     Average packs/day: 0.5 packs/day for 10.0 years (5.0 ttl pk-yrs)     Types: Cigarettes     Start date: 2003     Quit date: 2013     Years since quittin.0     Passive exposure: Never    Smokeless tobacco: Never    Tobacco comments:        Substance and Sexual Activity    Alcohol use: No    Drug use: No    Sexual activity: Yes     Partners: Male   Social History Narrative    Live with      Social Drivers of Health     Physical Activity: Unknown (2/3/2022)    Exercise Vital Sign     Days of Exercise per Week: 0 days   Stress: No Stress Concern Present (2/3/2022)    [employer] - Occupational Stress Questionnaire     Feeling of Stress :  Not at all

## 2025-04-25 NOTE — PATIENT INSTRUCTIONS
Avoid anything with sugar in the 1st 3 ingredients including honey and syrup.  Avoid dried fruits like raise nodes and apricots.  Other fruits and vegetables are okay but the sugar content is higher in bananas and grapes.  Avoid large portions on your starchy foods:  Bread rice potatoes and pasta. We understand that controlling diabetes can feel overwhelming at times. We are here to offer the tools and support to help you manage your diabetes and meet your health goals. Please reference the meal planning guide below.     Diabetic Meal Planning    About this topic  Healthy eating is an important part of keeping your diabetes in control. A balanced diet along with your diabetic drugs will help you control your blood sugar. The right portions of healthy foods may help keep your sugar within your goal range. It may also help to lower or maintain your weight, manage cholesterol, the amount of fat in your blood, and blood pressure.      Image(s)    What will the results be?  Healthy eating may help you lower your blood sugar and keep it in a safe range. Keeping your blood sugar in a safe range may lower your chances for long term problems from your diabetes. You may be less likely to have damage to your kidneys, heart, eyes, or nerves.    What changes to diet are needed?  Healthy eating means:  Eating a range of foods from all food groups.  Eating the right size portions. Read the nutrition facts on each food you eat.  Eating meals and snacks at the same time each day. Do not skip a meal or snack. Skipping meals may cause your blood sugar to go too low, especially if you are on drugs for your diabetes. Skipping meals can also cause you to eat too much at the next meal.  Talk to your diabetes educator about making a personal meal plan for you. They can also help you eat the foods you like by modifying them to be healthier.    Who should use this diet?  This diet is helpful to people with high blood sugar or  diabetes.    What foods are good to eat?  It is important to make a healthy meal. Eat a variety of different foods in the right portion.  Fill half of your plate with non-starchy vegetables. Non-starchy vegetables include: Broccoli, green beans, carrots, greens (puma, kale, mustard, turnip), onions, tomatoes, asparagus, beets, cauliflower, celery, and cucumber. Non-starchy vegetables are high in fiber and low in carbohydrates. These will help keep you full for longer without raising your blood sugar.  Fill 1/4 of your plate with carbohydrates. Try to choose whole grain options. Whole-grain products have more fiber which will make you feel full. Carbohydrates include: Bread, rice, pasta, and starchy vegetables. Starchy vegetables include beans, potatoes, acorn squash, butternut squash, corn, and peas.  Fill 1/4 of your plate with protein. Choose low-fat cuts of meat like boneless, skinless chicken breast; pork loin; 90% lean beef; lean and skinless turkey meat; and fresh fish (not fried) such as tuna, salmon, or mackerel.  Add a low-fat or non-fat dairy product like 8 ounces (240 mL) of 1% or skim milk or 6 ounces (180 mL) of low-fat yogurt. Eat the recommended serving. Milk and yogurt have carbohydrates which raise your blood sugar.  Add a small piece of fruit or 1/2 cup (120 grams) of frozen or canned fruit. Choose canned fruits in juice or water. Fruits include apples, bananas, peaches, pears, pineapple, plums, and oranges. Eat the recommended serving. Fruit has carbohydrates which can raise your blood sugar.  Include healthy fats in your meal like olive oil, canola oil, avocado, and nuts.  Other good foods to include in your diet are:  Foods high in fiber. Adding fiber to your meals may help control your blood sugar and cholesterol levels. It may also help with weight loss and prevent belly problems. If you are younger than 50, it is recommended to get 25 to 38 grams per day. If you are older than 50, it is  recommended to get 21 to 30 grams per day. Good sources of fiber include:  Nuts and seeds  Beans, peas, and other legumes  Whole-grain products  Fruits  Vegetables  Smart snacks in the right portion. Do not go too long in between meals. Ask your dietitian when you should have a snack in between your meals. Snack on things like:  Nuts  Vegetables and low-fat dressing  Light popcorn  Low-fat cheese and crackers  1/2 sandwich    What foods should be limited or avoided?  You may need to limit the amount of some foods you eat and how often you eat them. Foods that should be limited include:  High fat or processed foods like:  Meraz  Sausage  Hot dogs  Whole-fat dairy products  Potato chips  Foods high in sodium like:  Canned soups  Soy sauce and some salad dressings  Cured meats  Salted snack foods like pretzels  Olives  Fats and oils like:  Margarine  Salad dressing  Gravy  Lard or shortening  Foods high in sugar like:  Candy  Cookies  Cake  Ice cream  Table sugar  Soda  Juice drinks  Starches that are not whole grain like:  White rice  French fries  White pasta  White bread  Sugary cereals  Baked goods, pastries, croissants  Beer, wine, and mixed drinks (alcohol). Drink alcohol in moderation (1 drink per day or less for adult women and 2 drinks per day or less for adult men). Drinking alcohol can cause fluctuations in your blood sugar and interfere with how your diabetic drugs work. Talk to your doctor about how you can safely include alcohol into your diet.    What can be done to prevent this health problem?  Some people have a higher chance of developing diabetes than others. This is a life-long problem. You can still lead a normal life. Diabetes can be managed through diet, exercise, and drugs. It is important to have support from your family and friends to help you with your goals.    When do I need to call the doctor?  Blood sugar level is above 240 mg/dL for more than a day  Blood sugar level drops to less than  40 and does not respond to 15 grams of simple carbohydrate, like 4 glucose tablets or 1/2 cup (120 mL) of fruit juice  Trouble breathing  Very sleepy and trouble concentrating  Feeling very thirsty  Needing to urinate (pee) more than normal  Throw up more than once or have many loose stools  You are so sick you cannot eat or drink  Fever over 101°F (38°C)  Questions about your diet plan  You are not feeling better in 2 to 3 days or you are feeling worse    Helpful tips  Plan ahead. Plan your meals and grocery list before going to the store. This will help you to choose foods that are good for you.  Pack a lunch. Take healthy meals and snacks with you to work.  Keep a food journal to help keep you on track. Take note of foods that keep your blood sugar in goal range. Also note foods and meals that raise or lower your blood sugar. There are apps for your phone that can help with this.  Visit a restaurant's website before eating out. You can see the menu options and nutritional facts. This way, you can see which items best fit into your diet plan. Call ahead if you have questions.    Disclaimer.This generalized information is a limited summary of diagnosis, treatment, and/or medication information. It is not meant to be comprehensive and should be used as a tool to help the user understand and/or assess potential diagnostic and treatment options. It does NOT include all information about conditions, treatments, medications, side effects, or risks that may apply to a specific patient. It is not intended to be medical advice or a substitute for the medical advice, diagnosis, or treatment of a health care provider based on the health care provider's examination and assessment of a patient's specific and unique circumstances. Patients must speak with a health care provider for complete information about their health, medical questions, and treatment options, including any risks or benefits regarding use of medications.  This information does not endorse any treatments or medications as safe, effective, or approved for treating a specific patient. [employer] and its affiliates disclaim any warranty or liability relating to this information or the use thereof. The use of this information is governed by the Terms of Use, available at Terms of Use. ©2022 [employer] and its affiliates and/or licensors. All rights reserved.

## 2025-06-12 DIAGNOSIS — E78.5 HYPERLIPIDEMIA WITH TARGET LOW DENSITY LIPOPROTEIN (LDL) CHOLESTEROL LESS THAN 100 MG/DL: ICD-10-CM

## 2025-06-12 RX ORDER — ATORVASTATIN CALCIUM 20 MG/1
20 TABLET, FILM COATED ORAL
Qty: 90 TABLET | Refills: 3 | Status: SHIPPED | OUTPATIENT
Start: 2025-06-12

## 2025-06-12 NOTE — TELEPHONE ENCOUNTER
Refill Decision Note   Kamila Lynn  is requesting a refill authorization.  Brief Assessment and Rationale for Refill:  Approve     Medication Therapy Plan:        Comments:     Note composed:12:16 PM 06/12/2025

## 2025-06-12 NOTE — TELEPHONE ENCOUNTER
No care due was identified.  [facility] Embedded Care Due Messages. Reference number: [id number].   6/12/2025 11:50:44 AM CDT